# Patient Record
Sex: FEMALE | Race: WHITE | URBAN - METROPOLITAN AREA
[De-identification: names, ages, dates, MRNs, and addresses within clinical notes are randomized per-mention and may not be internally consistent; named-entity substitution may affect disease eponyms.]

---

## 2017-04-27 ENCOUNTER — HOSPITAL ENCOUNTER (OUTPATIENT)
Dept: OTHER | Age: 32
Discharge: HOME | End: 2017-06-03

## 2021-08-12 ENCOUNTER — TELEPHONE (OUTPATIENT)
Dept: OBGYN | Age: 36
End: 2021-08-12

## 2021-08-12 NOTE — TELEPHONE ENCOUNTER
Would like IUD ordered so you can remove then replace it. Explained if for birth control she could not do at same time of annual in Sept. She wanted me to ask you. Also would like skin tags removed.

## 2021-08-16 NOTE — TELEPHONE ENCOUNTER
She will need to schedule IUD removal with reinsertion next door if not having any concerns other than birth control. Any dysmenorrhea or heavy cycles?  We will address the skin tags at her annual exam.